# Patient Record
Sex: FEMALE | Race: WHITE
[De-identification: names, ages, dates, MRNs, and addresses within clinical notes are randomized per-mention and may not be internally consistent; named-entity substitution may affect disease eponyms.]

---

## 2018-01-20 ENCOUNTER — HOSPITAL ENCOUNTER (OUTPATIENT)
Dept: HOSPITAL 17 - PHED | Age: 48
Setting detail: OBSERVATION
LOS: 2 days | Discharge: HOME | End: 2018-01-22
Attending: HOSPITALIST | Admitting: HOSPITALIST
Payer: OTHER GOVERNMENT

## 2018-01-20 VITALS — WEIGHT: 165.57 LBS | HEIGHT: 67 IN | BODY MASS INDEX: 25.99 KG/M2

## 2018-01-20 VITALS
RESPIRATION RATE: 16 BRPM | OXYGEN SATURATION: 99 % | DIASTOLIC BLOOD PRESSURE: 61 MMHG | SYSTOLIC BLOOD PRESSURE: 113 MMHG | HEART RATE: 67 BPM

## 2018-01-20 VITALS
HEART RATE: 97 BPM | OXYGEN SATURATION: 100 % | DIASTOLIC BLOOD PRESSURE: 89 MMHG | TEMPERATURE: 98.4 F | RESPIRATION RATE: 24 BRPM | SYSTOLIC BLOOD PRESSURE: 139 MMHG

## 2018-01-20 VITALS — SYSTOLIC BLOOD PRESSURE: 109 MMHG | DIASTOLIC BLOOD PRESSURE: 61 MMHG | TEMPERATURE: 98.9 F

## 2018-01-20 VITALS
TEMPERATURE: 96.8 F | OXYGEN SATURATION: 97 % | DIASTOLIC BLOOD PRESSURE: 64 MMHG | HEART RATE: 71 BPM | RESPIRATION RATE: 20 BRPM | SYSTOLIC BLOOD PRESSURE: 113 MMHG

## 2018-01-20 VITALS — OXYGEN SATURATION: 100 % | RESPIRATION RATE: 22 BRPM

## 2018-01-20 DIAGNOSIS — F10.20: ICD-10-CM

## 2018-01-20 DIAGNOSIS — F17.210: ICD-10-CM

## 2018-01-20 DIAGNOSIS — E27.8: ICD-10-CM

## 2018-01-20 DIAGNOSIS — K85.20: Primary | ICD-10-CM

## 2018-01-20 DIAGNOSIS — K58.9: ICD-10-CM

## 2018-01-20 DIAGNOSIS — Z79.899: ICD-10-CM

## 2018-01-20 LAB
ALBUMIN SERPL-MCNC: 4 GM/DL (ref 3.4–5)
ALP SERPL-CCNC: 99 U/L (ref 45–117)
ALT SERPL-CCNC: 25 U/L (ref 10–53)
AST SERPL-CCNC: 33 U/L (ref 15–37)
BASOPHILS # BLD AUTO: 0 TH/MM3 (ref 0–0.2)
BASOPHILS NFR BLD: 0.5 % (ref 0–2)
BILIRUB SERPL-MCNC: 0.4 MG/DL (ref 0.2–1)
BUN SERPL-MCNC: 5 MG/DL (ref 7–18)
CALCIUM SERPL-MCNC: 9.5 MG/DL (ref 8.5–10.1)
CHLORIDE SERPL-SCNC: 102 MEQ/L (ref 98–107)
CREAT SERPL-MCNC: 0.78 MG/DL (ref 0.5–1)
EOSINOPHIL # BLD: 0.1 TH/MM3 (ref 0–0.4)
EOSINOPHIL NFR BLD: 0.8 % (ref 0–4)
ERYTHROCYTE [DISTWIDTH] IN BLOOD BY AUTOMATED COUNT: 16.4 % (ref 11.6–17.2)
GFR SERPLBLD BASED ON 1.73 SQ M-ARVRAT: 79 ML/MIN (ref 89–?)
GLUCOSE SERPL-MCNC: 99 MG/DL (ref 74–106)
HCO3 BLD-SCNC: 24.9 MEQ/L (ref 21–32)
HCT VFR BLD CALC: 32.9 % (ref 35–46)
HGB BLD-MCNC: 10.2 GM/DL (ref 11.6–15.3)
LIPASE: 1075 U/L (ref 73–393)
LYMPHOCYTES # BLD AUTO: 1.5 TH/MM3 (ref 1–4.8)
LYMPHOCYTES NFR BLD AUTO: 16.3 % (ref 9–44)
MCH RBC QN AUTO: 23.6 PG (ref 27–34)
MCHC RBC AUTO-ENTMCNC: 31 % (ref 32–36)
MCV RBC AUTO: 76.2 FL (ref 80–100)
MONOCYTE #: 0.7 TH/MM3 (ref 0–0.9)
MONOCYTES NFR BLD: 7.1 % (ref 0–8)
NEUTROPHILS # BLD AUTO: 7.2 TH/MM3 (ref 1.8–7.7)
NEUTROPHILS NFR BLD AUTO: 75.3 % (ref 16–70)
PLATELET # BLD: 403 TH/MM3 (ref 150–450)
PMV BLD AUTO: 7.8 FL (ref 7–11)
PROT SERPL-MCNC: 8.3 GM/DL (ref 6.4–8.2)
RBC # BLD AUTO: 4.32 MIL/MM3 (ref 4–5.3)
SODIUM SERPL-SCNC: 135 MEQ/L (ref 136–145)
WBC # BLD AUTO: 9.5 TH/MM3 (ref 4–11)

## 2018-01-20 PROCEDURE — 96376 TX/PRO/DX INJ SAME DRUG ADON: CPT

## 2018-01-20 PROCEDURE — 80307 DRUG TEST PRSMV CHEM ANLYZR: CPT

## 2018-01-20 PROCEDURE — 83690 ASSAY OF LIPASE: CPT

## 2018-01-20 PROCEDURE — 80053 COMPREHEN METABOLIC PANEL: CPT

## 2018-01-20 PROCEDURE — 74177 CT ABD & PELVIS W/CONTRAST: CPT

## 2018-01-20 PROCEDURE — 99285 EMERGENCY DEPT VISIT HI MDM: CPT

## 2018-01-20 PROCEDURE — 96372 THER/PROPH/DIAG INJ SC/IM: CPT

## 2018-01-20 PROCEDURE — 85025 COMPLETE CBC W/AUTO DIFF WBC: CPT

## 2018-01-20 PROCEDURE — G0378 HOSPITAL OBSERVATION PER HR: HCPCS

## 2018-01-20 PROCEDURE — C9113 INJ PANTOPRAZOLE SODIUM, VIA: HCPCS

## 2018-01-20 PROCEDURE — 96375 TX/PRO/DX INJ NEW DRUG ADDON: CPT

## 2018-01-20 PROCEDURE — 96361 HYDRATE IV INFUSION ADD-ON: CPT

## 2018-01-20 PROCEDURE — 96374 THER/PROPH/DIAG INJ IV PUSH: CPT

## 2018-01-20 RX ADMIN — OXYTOCIN SCH MLS/HR: 10 INJECTION, SOLUTION INTRAMUSCULAR; INTRAVENOUS at 19:30

## 2018-01-20 RX ADMIN — ENOXAPARIN SODIUM SCH MG: 40 INJECTION SUBCUTANEOUS at 19:30

## 2018-01-20 RX ADMIN — ONDANSETRON PRN MG: 2 INJECTION, SOLUTION INTRAMUSCULAR; INTRAVENOUS at 19:30

## 2018-01-20 RX ADMIN — MORPHINE SULFATE PRN MG: 8 INJECTION INTRAVENOUS at 19:42

## 2018-01-20 RX ADMIN — Medication SCH ML: at 20:34

## 2018-01-20 RX ADMIN — MORPHINE SULFATE PRN MG: 8 INJECTION INTRAVENOUS at 22:55

## 2018-01-20 NOTE — RADRPT
EXAM DATE/TIME:  01/20/2018 17:57 

 

[facility] COMPARISON:     

CT ABDOMEN & PELVIS W CONTRAST, December 14, 2016, 17:28.

 

 

INDICATIONS :     

Mid abdominal pain since this morning.

                      

 

IV CONTRAST:     

100 cc Omnipaque 350 (iohexol) IV 

 

 

ORAL CONTRAST:      

No oral contrast ingested.

                      

 

RADIATION DOSE:     

10.30 CTDIvol (mGy) 

 

 

MEDICAL HISTORY :     

Pancreatitis. Irritable bowel syndrome. 

 

SURGICAL HISTORY :      

Tonsillectomy. Cholecystectomy.

 

ENCOUNTER:      

Initial

 

ACUITY:      

1 day

 

PAIN SCALE:      

10/10

 

LOCATION:        

middle abdomen

 

TECHNIQUE:     

Volumetric scanning of the abdomen and pelvis was performed.  Using automated exposure control and ad
justment of the mA and/or kV according to patient size, radiation dose was kept as low as reasonably 
achievable to obtain optimal diagnostic quality images.  DICOM format image data is available electro
nically for review and comparison.  

 

FINDINGS:     

 

LOWER LUNGS:     

The visualized lower lungs are clear.

 

LIVER:     

Homogeneous density without lesion.  No calcified gallstones. Intra--and extra hepatic biliary ductal
 dilatation distal common bile duct measures 2.3 cm. Cholecystectomy.

 

SPLEEN:     

Normal size without lesion.

 

PANCREAS:     

Within normal limits.

 

KIDNEYS:     

Normal in size and shape.  There is no mass, stone or hydronephrosis.

 

ADRENAL GLANDS:     

Right adrenal gland within normal limits. Left adrenal nodule again seen.

 

VASCULAR:     

There is no aortic aneurysm.

 

BOWEL/MESENTERY:     

The stomach and colon demonstrate no acute abnormality. Fluid filled small bowel loops without obstru
ction.  There is no free intraperitoneal air or fluid.

 

ABDOMINAL WALL:     

Within normal limits.

 

RETROPERITONEUM:     

There is no lymphadenopathy. 

 

BLADDER:     

No wall thickening or mass. 

 

REPRODUCTIVE:     

Heterogeneous appearing uterus. 

 

INGUINAL:     

There is no lymphadenopathy or hernia. 

 

MUSCULOSKELETAL:     

Within normal limits for patient age. 

 

CONCLUSION:     

1. Intra-and extra hepatic biliary ductal dilatation with previous cholecystectomy. This appears unch
anged.

2. Left adrenal nodule, unchanged likely lipid poor adenoma.

3. No acute inflammatory process.

 

 

 

 Prasanna Maloney MD on January 20, 2018 at 18:12           

Board Certified Radiologist.

 This report was verified electronically.

## 2018-01-20 NOTE — PD
HPI


Chief Complaint:  Abdominal Pain


Time Seen by Provider:  17:13


Travel History


International Travel<30 days:  No


Contact w/Intl Traveler<30days:  No


Traveled to known affect area:  No





History of Present Illness


HPI


39-year-old female complains of epigastric abdominal pain since this morning, 

about 10 hours.  Onset sudden.  Patient has history of pancreatitis and 

believes the same today.  She drank alcohol yesterday.  Timing constant.  

Severe.  It radiates to the back.  It's worse with palpation.  She denies fever.





PFSH


Past Medical History


Hx Anticoagulant Therapy:  No


Diabetes:  No


Diminished Hearing:  No


Gastrointestinal Disorders:  Yes (IBS)


Pancreatitis:  Yes


Pregnant?:  Unknown


LMP:  12/19/2017





Past Surgical History


Cholecystectomy:  Yes


Tonsillectomy:  Yes





Social History


Alcohol Use:  Yes (6PK OR MORE DAILY)


Tobacco Use:  Yes (1.5PPD)


Substance Use:  No





Allergies-Medications


(Allergen,Severity, Reaction):  


Coded Allergies:  


     No Known Allergies (Unverified  Adverse Reaction, Unknown, 1/20/18)


Reported Meds & Prescriptions





Reported Meds & Active Scripts


Active


Omeprazole 20 Mg Tab 20 Mg PO DAILY


Reported


Biotin 10 Mg Tab 10 Mg PO DAILY


Vitamin B12-Folic Acid (Cobalamine Combinations) 500-400 Mcg Tab 1 Tab PO DAILY


Colestid (Colestipol HCl) 1 Gm Tab 4 Gm PO BID








Review of Systems


Except as stated in HPI:  all other systems reviewed are Neg


General / Constitutional:  No: Fever





Physical Exam


Narrative


GENERAL: 47-year-old female pleasant well-nourished well-developed


SKIN: Warm and dry.


HEAD: Atraumatic. Normocephalic. 


EYES: Pupils equal and round. No scleral icterus. No injection or drainage. 


ENT: No nasal bleeding or discharge.  Mucous membranes pink and moist.


NECK: Trachea midline. No JVD. 


CARDIOVASCULAR: Regular rate and rhythm.  


RESPIRATORY: No accessory muscle use. Clear to auscultation. Breath sounds 

equal bilaterally. 


GASTROINTESTINAL: Soft.  Diffuse nonspecific tenderness with guarding.


MUSCULOSKELETAL: Extremities without clubbing, cyanosis, or edema. No obvious 

deformities. 


NEUROLOGICAL: Awake and alert. No obvious cranial nerve deficits.  Motor 

grossly within normal limits. Five out of 5 muscle strength in the arms and 

legs.  Normal speech.


PSYCHIATRIC: Appropriate mood and affect; insight and judgment normal.





Data


Data


Last Documented VS





Vital Signs








  Date Time  Temp Pulse Resp B/P (MAP) Pulse Ox O2 Delivery O2 Flow Rate FiO2


 


1/20/18 18:34   19     


 


1/20/18 17:54     100 Room Air  


 


1/20/18 17:09 98.4 97  139/89 (106)    





VS reviewed


Orders





 Orders


Complete Blood Count With Diff (1/20/18 17:15)


Comprehensive Metabolic Panel (1/20/18 17:15)


Lipase (1/20/18 17:15)


Ct Abd/Pel W Iv Contrast(Rout) (1/20/18 17:15)


Iv Access Insert/Monitor (1/20/18 17:15)


Ecg Monitoring (1/20/18 17:15)


Oximetry (1/20/18 17:15)


Hydromorphone Pf Inj (Dilaudid Pf Inj) (1/20/18 17:15)


Sodium Chlor 0.9% 1000 Ml Inj (Ns 1000 M (1/20/18 17:15)


Sodium Chloride 0.9% Flush (Ns Flush) (1/20/18 17:15)


Famotidine Inj (Pepcid Inj) (1/20/18 17:15)


Al-Mag Hy-Si 40-40-4 Mg/Ml Liq (Mag-Al P (1/20/18 17:15)


Lidocaine 2% Viscous (Xylocaine 2% Visco (1/20/18 17:15)


Alcohol (Ethanol) (1/20/18 17:15)


Hydromorphone Pf Inj (Dilaudid Pf Inj) (1/20/18 18:15)


Iohexol 350 Inj (Omnipaque 350 Inj) (1/20/18 18:11)


Admit Order (Ed Use Only) (1/20/18 )


Vital Signs (Adult) Q4H (1/20/18 18:45)


Diet Npo (1/20/18 Dinner)


Diet Heart Healthy (1/20/18 Dinner)


Activity Bed Rest (1/20/18 18:45)





Labs





Laboratory Tests








Test


  1/20/18


17:30


 


White Blood Count 9.5 TH/MM3 


 


Red Blood Count 4.32 MIL/MM3 


 


Hemoglobin 10.2 GM/DL 


 


Hematocrit 32.9 % 


 


Mean Corpuscular Volume 76.2 FL 


 


Mean Corpuscular Hemoglobin 23.6 PG 


 


Mean Corpuscular Hemoglobin


Concent 31.0 % 


 


 


Red Cell Distribution Width 16.4 % 


 


Platelet Count 403 TH/MM3 


 


Mean Platelet Volume 7.8 FL 


 


Neutrophils (%) (Auto) 75.3 % 


 


Lymphocytes (%) (Auto) 16.3 % 


 


Monocytes (%) (Auto) 7.1 % 


 


Eosinophils (%) (Auto) 0.8 % 


 


Basophils (%) (Auto) 0.5 % 


 


Neutrophils # (Auto) 7.2 TH/MM3 


 


Lymphocytes # (Auto) 1.5 TH/MM3 


 


Monocytes # (Auto) 0.7 TH/MM3 


 


Eosinophils # (Auto) 0.1 TH/MM3 


 


Basophils # (Auto) 0.0 TH/MM3 


 


CBC Comment AUTO DIFF 


 


Differential Comment


  AUTO DIFF


CONFIRMED


 


Platelet Estimate NORMAL 


 


Platelet Morphology Comment NORMAL 


 


Blood Urea Nitrogen 5 MG/DL 


 


Creatinine 0.78 MG/DL 


 


Random Glucose 99 MG/DL 


 


Total Protein 8.3 GM/DL 


 


Albumin 4.0 GM/DL 


 


Calcium Level 9.5 MG/DL 


 


Alkaline Phosphatase 99 U/L 


 


Aspartate Amino Transf


(AST/SGOT) 33 U/L 


 


 


Alanine Aminotransferase


(ALT/SGPT) 25 U/L 


 


 


Total Bilirubin 0.4 MG/DL 


 


Sodium Level 135 MEQ/L 


 


Potassium Level 4.3 MEQ/L 


 


Chloride Level 102 MEQ/L 


 


Carbon Dioxide Level 24.9 MEQ/L 


 


Anion Gap 8 MEQ/L 


 


Estimat Glomerular Filtration


Rate 79 ML/MIN 


 


 


Lipase 1075 U/L 


 


Ethyl Alcohol Level


  LESS THAN 3


MG/DL











MDM


Medical Decision Making


Medical Screen Exam Complete:  Yes


Emergency Medical Condition:  Yes


Medical Record Reviewed:  Yes


Differential Diagnosis


Constipation, Gastritis, Acute Cholecystitis, Biliary Colic, Pancreatitis, SORENSON

, Hepatitis, Bowel Obstruction, Cystitis, Mesenteric Ischemia, AAA, Appendicitis

, Renal Stone/Hydronephrosis, GERD, perforated viscous


Narrative Course


CBC & BMP Diagram


1/20/18 17:30








Total Protein 8.3 H, Albumin 4.0, Calcium Level 9.5, Alkaline Phosphatase 99, 

Aspartate Amino Transf (AST/SGOT) 33, Alanine Aminotransferase (ALT/SGPT) 25, 

Total Bilirubin 0.4





EtOH 3








Last Impressions








Abdomen/Pelvis CT 1/20/18 3899 Signed





Impressions: 





 Service Date/Time:  Saturday, January 20, 2018 17:57 - CONCLUSION:  1. Intra-

and 





 extra hepatic biliary ductal dilatation with previous cholecystectomy. This 





 appears unchanged. 2. Left adrenal nodule, unchanged likely lipid poor 

adenoma. 





 3. No acute inflammatory process.     Prasanna Maloney MD 





Admission for pain control, IVF, and antiemetics.





Diagnosis





 Primary Impression:  


 Pancreatitis


 Qualified Codes:  K85.20 - Alcohol induced acute pancreatitis without necrosis 

or infection





Admitting Information


Admitting Physician Requests:  Admit











Mathieu Sloan MD Jan 20, 2018 17:18

## 2018-01-21 VITALS
RESPIRATION RATE: 20 BRPM | TEMPERATURE: 96 F | OXYGEN SATURATION: 98 % | DIASTOLIC BLOOD PRESSURE: 71 MMHG | HEART RATE: 93 BPM | SYSTOLIC BLOOD PRESSURE: 152 MMHG

## 2018-01-21 VITALS
DIASTOLIC BLOOD PRESSURE: 72 MMHG | TEMPERATURE: 96.7 F | HEART RATE: 64 BPM | OXYGEN SATURATION: 97 % | RESPIRATION RATE: 16 BRPM | SYSTOLIC BLOOD PRESSURE: 116 MMHG

## 2018-01-21 VITALS
DIASTOLIC BLOOD PRESSURE: 63 MMHG | SYSTOLIC BLOOD PRESSURE: 114 MMHG | TEMPERATURE: 98.3 F | OXYGEN SATURATION: 99 % | RESPIRATION RATE: 16 BRPM | HEART RATE: 70 BPM

## 2018-01-21 VITALS
SYSTOLIC BLOOD PRESSURE: 110 MMHG | TEMPERATURE: 96.7 F | DIASTOLIC BLOOD PRESSURE: 59 MMHG | OXYGEN SATURATION: 95 % | RESPIRATION RATE: 16 BRPM | HEART RATE: 71 BPM

## 2018-01-21 VITALS
DIASTOLIC BLOOD PRESSURE: 81 MMHG | RESPIRATION RATE: 20 BRPM | TEMPERATURE: 96.2 F | OXYGEN SATURATION: 98 % | HEART RATE: 67 BPM | SYSTOLIC BLOOD PRESSURE: 121 MMHG

## 2018-01-21 RX ADMIN — MORPHINE SULFATE PRN MG: 8 INJECTION INTRAVENOUS at 04:54

## 2018-01-21 RX ADMIN — MORPHINE SULFATE PRN MG: 8 INJECTION INTRAVENOUS at 14:24

## 2018-01-21 RX ADMIN — Medication PRN ML: at 21:03

## 2018-01-21 RX ADMIN — PROMETHAZINE HYDROCHLORIDE SCH MG: 25 TABLET ORAL at 15:48

## 2018-01-21 RX ADMIN — Medication SCH ML: at 20:58

## 2018-01-21 RX ADMIN — MORPHINE SULFATE PRN MG: 8 INJECTION INTRAVENOUS at 01:47

## 2018-01-21 RX ADMIN — MORPHINE SULFATE PRN MG: 8 INJECTION INTRAVENOUS at 08:08

## 2018-01-21 RX ADMIN — MORPHINE SULFATE PRN MG: 8 INJECTION INTRAVENOUS at 18:00

## 2018-01-21 RX ADMIN — PROMETHAZINE HYDROCHLORIDE SCH MG: 25 TABLET ORAL at 09:29

## 2018-01-21 RX ADMIN — OXYTOCIN SCH MLS/HR: 10 INJECTION, SOLUTION INTRAMUSCULAR; INTRAVENOUS at 20:57

## 2018-01-21 RX ADMIN — MORPHINE SULFATE PRN MG: 8 INJECTION INTRAVENOUS at 11:17

## 2018-01-21 RX ADMIN — Medication SCH ML: at 08:22

## 2018-01-21 RX ADMIN — PANTOPRAZOLE SODIUM SCH MG: 40 INJECTION, POWDER, FOR SOLUTION INTRAVENOUS at 09:29

## 2018-01-21 RX ADMIN — PROMETHAZINE HYDROCHLORIDE SCH MG: 25 TABLET ORAL at 20:58

## 2018-01-21 RX ADMIN — OXYTOCIN SCH MLS/HR: 10 INJECTION, SOLUTION INTRAMUSCULAR; INTRAVENOUS at 12:07

## 2018-01-21 RX ADMIN — ONDANSETRON PRN MG: 2 INJECTION, SOLUTION INTRAMUSCULAR; INTRAVENOUS at 01:46

## 2018-01-21 RX ADMIN — MORPHINE SULFATE PRN MG: 8 INJECTION INTRAVENOUS at 21:03

## 2018-01-21 RX ADMIN — OXYTOCIN SCH MLS/HR: 10 INJECTION, SOLUTION INTRAMUSCULAR; INTRAVENOUS at 03:10

## 2018-01-21 RX ADMIN — ENOXAPARIN SODIUM SCH MG: 40 INJECTION SUBCUTANEOUS at 20:00

## 2018-01-21 NOTE — HHI.HP
__________________________________________________





[facility]


Service


[facility]


Primary Care Physician


No Primary Care Physician


Admission Diagnosis





Pancreatitis


Diagnoses:  


Chief Complaint:  


Abdominal pain


Travel History


International Travel<30 Days:  No


Contact w/Intl Traveler <30 Da:  No


Traveled to Known Affected Are:  No


History of Present Illness


This patient is a 47-year-old female with a known history of chronic alcohol 

dependency was current with abdominal pain for 1 day.  Pain is severe and rated 

10 out of 10 and improved somewhat with morphine.  She's had pancreatic eyes in 

the past and believes this is a same type of pain.  It is in the abdomen 

radiating to the back worse with palpation.  No fevers or chills.  There has 

been some nausea and vomiting associated with it.  Patient's been recommended 

for observation due to acute pancreatitis





Review of Systems


Constitutional:  DENIES: Diaphoretic episodes, Fatigue, Fever, Weight gain, 

Weight loss, Chills, Dizziness, Change in appetite, Night Sweats


Endocrine:  DENIES: Abnorml menstrual pattern, Heat/cold intolerance, Polydipsia

, Polyuria, Polyphagia


Eyes:  DENIES: Blurred vision, Diplopia, Eye inflammation, Eye pain, Vision loss

, Photosensitivity, Double Vision


Ears, nose, mouth, throat:  DENIES: Tinnitus, Hearing loss, Vertigo, Nasal 

discharge, Oral lesions, Throat pain, Hoarseness, Ear Pain, Running Nose, 

Epistaxis, Sinus Pain, Toothache, Odynophagia


Respiratory:  DENIES: Apneas, Cough, Snoring, Wheezing, Hemoptysis, Sputum 

production, Shortness of breath


Cardiovascular:  DENIES: Chest pain, Palpitations, Syncope, Dyspnea on Exertion

, PND, Lower Extremity Edema, Orthopnea, Claudication


Gastrointestinal:  COMPLAINS OF: Abdominal pain, Diarrhea, Nausea, Vomiting, 

DENIES: Black stools, Bloody stools, Constipation, Difficulty Swallowing, 

Anorexia


Genitourinary:  DENIES: Abnormal vaginal bleeding, Dysmenorrhea, Dyspareunia, 

Sexual dysfunction, Urinary frequency, Urinary incontinence, Urgency, Hematuria

, Dysuria, Nocturia, Vaginal discharge


Musculoskeletal:  DENIES: Joint pain, Muscle aches, Stiffness, Joint Swelling, 

Back pain, Neck pain


Integumentary:  DENIES: Abnormal pigmentation, Pruritus, Rash, Nail changes, 

Breast masses, Breast skin changes, Nipple discharge


Hematologic/lymphatic:  DENIES: Bruising, Lymphadenopathy


Immunologic/allergic:  DENIES: Eczema, Urticaria


Neurologic:  DENIES: Abnormal gait, Headache, Localized weakness, Paresthesias, 

Seizures, Speech Problems, Tremor, Poor Balance


Psychiatric:  DENIES: Anxiety, Confusion, Mood changes, Depression, 

Hallucinations, Agitation, Suicidal Ideation, Homicidal Ideation, Delusions


Except as stated in HPI:  all other systems reviewed are Neg





Past Family Social History


Past Medical History


Pancreatitis


Irritable bowel syndrome


Past Surgical History


Gallbladder


Reported Medications


Reviewed in the EMR, patient takes B-12, Imodium, ibuprofen and biotin


Allergies:  


Coded Allergies:  


     No Known Allergies (Unverified  Allergy, Unknown, 1/20/18)


Active Ordered Medications


Reviewed in the EMR


Family History


Father's history unknown, mother had lung cancer and passed away from the


Social History


At least a sixpack of beer daily, 1-1/2 packs echo weekly, lives with her 







Physical Exam


Vital Signs





Vital Signs








  Date Time  Temp Pulse Resp B/P (MAP) Pulse Ox O2 Delivery O2 Flow Rate FiO2


 


1/21/18 00:00 96.2 67 20 121/81 (94) 98   


 


1/20/18 20:00 96.8 71 20 113/64 (80) 97   


 


1/20/18 19:52   20     


 


1/20/18 19:45 98.9 77 20 109/61 (77) 98   


 


1/20/18 19:01  67 16 113/61 (78) 99 Room Air  


 


1/20/18 18:56   16     


 


1/20/18 18:34   19     


 


1/20/18 17:54   22  100 Room Air  


 


1/20/18 17:09 98.4 97 24 139/89 (106) 100   








Physical Exam


GENERAL: This is a well-nourished, well-developed patient, coughing, 

complaining of belly pain, appears much older than stated age.


SKIN: No rashes, ecchymoses or lesions. Cool and dry.


HEAD: Atraumatic. Normocephalic. No temporal or scalp tenderness.


EYES: Pupils equal round and reactive. Extraocular motions intact. No scleral 

icterus. No injection or drainage. 


ENT: Nose without bleeding, purulent drainage or septal hematoma. Throat 

without erythema, tonsillar hypertrophy or exudate. Uvula midline. Airway 

patent.


NECK: Trachea midline. No JVD or lymphadenopathy. Supple, nontender, no 

meningeal signs.


CARDIOVASCULAR: Regular rate and rhythm without murmurs, gallops, or rubs. 


RESPIRATORY: Clear to auscultation. Breath sounds equal bilaterally. No wheezes

, rales, or rhonchi.  


GASTROINTESTINAL: Abdomen soft, non-tender, nondistended. No hepato-splenomegaly

, or palpable masses. No guarding.


MUSCULOSKELETAL: Extremities without clubbing, cyanosis, or edema. No joint 

tenderness, effusion, or edema noted. No calf tenderness. Negative Homans sign 

bilaterally.


NEUROLOGICAL: Awake and alert. Cranial nerves II through XII intact.  Motor and 

sensory grossly within normal limits. Five out of 5 muscle strength in all 

muscle groups.  Normal speech.


Laboratory





Laboratory Tests








Test


  1/20/18


17:30 1/21/18


07:05


 


White Blood Count 9.5  


 


Red Blood Count 4.32  


 


Hemoglobin 10.2  


 


Hematocrit 32.9  


 


Mean Corpuscular Volume 76.2  


 


Mean Corpuscular Hemoglobin 23.6  


 


Mean Corpuscular Hemoglobin


Concent 31.0 


  


 


 


Red Cell Distribution Width 16.4  


 


Platelet Count 403  


 


Mean Platelet Volume 7.8  


 


Neutrophils (%) (Auto) 75.3  


 


Lymphocytes (%) (Auto) 16.3  


 


Monocytes (%) (Auto) 7.1  


 


Eosinophils (%) (Auto) 0.8  


 


Basophils (%) (Auto) 0.5  


 


Neutrophils # (Auto) 7.2  


 


Lymphocytes # (Auto) 1.5  


 


Monocytes # (Auto) 0.7  


 


Eosinophils # (Auto) 0.1  


 


Basophils # (Auto) 0.0  


 


CBC Comment AUTO DIFF  


 


Differential Comment


  AUTO DIFF


CONFIRMED 


 


 


Platelet Estimate NORMAL  


 


Platelet Morphology Comment NORMAL  


 


Blood Urea Nitrogen 5  


 


Creatinine 0.78  


 


Random Glucose 99  


 


Total Protein 8.3  


 


Albumin 4.0  


 


Calcium Level 9.5  


 


Alkaline Phosphatase 99  


 


Aspartate Amino Transf


(AST/SGOT) 33 


  


 


 


Alanine Aminotransferase


(ALT/SGPT) 25 


  


 


 


Total Bilirubin 0.4  


 


Sodium Level 135  


 


Potassium Level 4.3  


 


Chloride Level 102  


 


Carbon Dioxide Level 24.9  


 


Anion Gap 8  


 


Estimat Glomerular Filtration


Rate 79 


  


 


 


Lipase 1075  834 


 


Ethyl Alcohol Level LESS THAN 3  








Result Diagram:  


1/20/18 1730                                                                   

             1/20/18 1730





Imaging





Last Impressions








Abdomen/Pelvis CT 1/20/18 1905 Signed





Impressions: 





 Service Date/Time:  Saturday, January 20, 2018 17:57 - CONCLUSION:  1. Intra-

and 





 extra hepatic biliary ductal dilatation with previous cholecystectomy. This 





 appears unchanged. 2. Left adrenal nodule, unchanged likely lipid poor 

adenoma. 





 3. No acute inflammatory process.     Prasanna Maloney MD 











Assessment and Plan


Problem List:  


(1) Pancreatitis


ICD Code:  K85.90 - Acute pancreatitis without necrosis or infection, 

unspecified


Status:  Acute


Plan:  We'll add Protonix


 We will continue with supportive care with fluids and antiemetics


Patient is about to discontinue alcohol


Clear diet








Problem Qualifiers





(1) Pancreatitis:  


Qualified Codes:  K85.20 - Alcohol induced acute pancreatitis without necrosis 

or infection








Maria De Jesus Longoria MD Jan 21, 2018 08:20

## 2018-01-22 VITALS
RESPIRATION RATE: 17 BRPM | SYSTOLIC BLOOD PRESSURE: 150 MMHG | HEART RATE: 65 BPM | DIASTOLIC BLOOD PRESSURE: 64 MMHG | OXYGEN SATURATION: 100 % | TEMPERATURE: 98.1 F

## 2018-01-22 VITALS
RESPIRATION RATE: 16 BRPM | DIASTOLIC BLOOD PRESSURE: 59 MMHG | OXYGEN SATURATION: 99 % | HEART RATE: 72 BPM | SYSTOLIC BLOOD PRESSURE: 128 MMHG | TEMPERATURE: 98.6 F

## 2018-01-22 RX ADMIN — Medication PRN ML: at 03:13

## 2018-01-22 RX ADMIN — Medication SCH ML: at 08:03

## 2018-01-22 RX ADMIN — PROMETHAZINE HYDROCHLORIDE SCH MG: 25 TABLET ORAL at 03:13

## 2018-01-22 RX ADMIN — PROMETHAZINE HYDROCHLORIDE SCH MG: 25 TABLET ORAL at 09:11

## 2018-01-22 RX ADMIN — Medication PRN ML: at 00:05

## 2018-01-22 RX ADMIN — MORPHINE SULFATE PRN MG: 8 INJECTION INTRAVENOUS at 03:13

## 2018-01-22 RX ADMIN — MORPHINE SULFATE PRN MG: 8 INJECTION INTRAVENOUS at 06:25

## 2018-01-22 RX ADMIN — OXYTOCIN SCH MLS/HR: 10 INJECTION, SOLUTION INTRAMUSCULAR; INTRAVENOUS at 03:14

## 2018-01-22 RX ADMIN — MORPHINE SULFATE PRN MG: 8 INJECTION INTRAVENOUS at 09:22

## 2018-01-22 RX ADMIN — Medication PRN ML: at 06:25

## 2018-01-22 RX ADMIN — PANTOPRAZOLE SODIUM SCH MG: 40 INJECTION, POWDER, FOR SOLUTION INTRAVENOUS at 09:11

## 2018-01-22 RX ADMIN — MORPHINE SULFATE PRN MG: 8 INJECTION INTRAVENOUS at 00:05

## 2018-01-22 NOTE — HHI.PR
Subjective


Remarks


In no acute distress.  Says she is not associated signs and did not vomit.  

Abdominal pain currently she is tolerating clear liquid diets and wants to try 

liquid diet.  Feels comfortable to go home today.  Denies fever or chills no 

nausea or vomiting no diarrhea or constipation.


No tremors or seizures.





Objective


Vitals





Vital Signs








  Date Time  Temp Pulse Resp B/P (MAP) Pulse Ox O2 Delivery O2 Flow Rate FiO2


 


1/22/18 08:00 98.1 65 17 150/64 (92) 100   


 


1/22/18 00:00 98.6 72 16 128/59 (82) 99   


 


1/21/18 20:00 98.3 70 16 114/63 (80) 99   


 


1/21/18 18:05   17     


 


1/21/18 16:00 96.0 93 20 152/71 (98) 98   


 


1/21/18 12:00 96.7 64 16 116/72 (87) 97   














I/O      


 


 1/21/18 1/21/18 1/21/18 1/22/18 1/22/18 1/22/18





 07:00 15:00 23:00 07:00 15:00 23:00


 


Intake Total 1282 ml   1976 ml  


 


Balance 1282 ml   1976 ml  


 


      


 


Intake Oral 0 ml   480 ml  


 


IV Total 1282 ml   1496 ml  


 


# Voids 2 1    








Result Diagram:  


1/20/18 1730                                                                   

             1/20/18 1730





Imaging





Last Impressions








Abdomen/Pelvis CT 1/20/18 1715 Signed





Impressions: 





 Service Date/Time:  Saturday, January 20, 2018 17:57 - CONCLUSION:  1. Intra-

and 





 extra hepatic biliary ductal dilatation with previous cholecystectomy. This 





 appears unchanged. 2. Left adrenal nodule, unchanged likely lipid poor 

adenoma. 





 3. No acute inflammatory process.     Prasanna Maloney MD 








Objective Remarks


GENERAL: This is a well-nourished, well-developed patient,  appears in nad. 


CARDIOVASCULAR: Regular rate and rhythm without murmurs, gallops, or rubs. 


RESPIRATORY: Clear to auscultation. Breath sounds equal bilaterally. No wheezes

, rales, or rhonchi.  


GASTROINTESTINAL: Abdomen soft, non-tender, nondistended. No hepato-splenomegaly

, or palpable masses. No guarding.


MUSCULOSKELETAL: Extremities without clubbing, cyanosis, or edema. No joint 

tenderness, effusion, or edema noted. No calf tenderness. Negative Homans sign 

bilaterally.


NEUROLOGICAL: Awake and alert. Cranial nerves II through XII intact.  Motor and 

sensory grossly within normal limits. Five out of 5 muscle strength in all 

muscle groups.  Normal speech.








A/P


Problem List:  


(1) Pancreatitis


ICD Code:  K85.90 - Acute pancreatitis without necrosis or infection, 

unspecified


Status:  Acute


Assessment and Plan


(1) Pancreatitis


ICD Code:  K85.90 - Acute pancreatitis without necrosis or infection, 

unspecified


Status:  Acute


Plan:  


Protonix


Continue with supportive care with fluids and antiemetics


Patient is about to discontinue alcohol, counselled extensively 


Advance diet to full liquid diet


Lipase is improving. Poss DC later today  if tolerates food.


Discharge Planning


Tolerated diet


DC home in stable condition to follow up as OP with PCP and consultants 


Activity ad ken as tolerated


Diet regular as tolerated


Meds per med reconciliations





Time at DC > 30 min





Problem Qualifiers





(1) Pancreatitis:  


Qualified Codes:  K85.20 - Alcohol induced acute pancreatitis without necrosis 

or infection








eNlly Ervin MD Jan 22, 2018 10:17

## 2018-01-22 NOTE — HHI.DCPOC
Discharge Care Plan


Goals to Promote Your Health


* To prevent worsening of your condition and complications


* To maintain your health at the optimal level


Directions to Meet Your Goals


*** Take your medications as prescribed


*** Follow your dietary instruction


*** Follow activity as directed








*** Keep your appointments as scheduled


*** Take your immunizations and boosters as scheduled


*** If your symptoms worsen call your PCP, if no PCP go to Urgent Care Center 

or Emergency Room***


*** Smoking is Dangerous to Your Health. Avoid second hand smoke***


***Call the 24-hour hour crisis hotline for domestic abuse at 1-529.457.8675***











Nelly Ervin MD Jan 22, 2018 10:19